# Patient Record
Sex: FEMALE | Race: BLACK OR AFRICAN AMERICAN | NOT HISPANIC OR LATINO | Employment: OTHER | RURAL
[De-identification: names, ages, dates, MRNs, and addresses within clinical notes are randomized per-mention and may not be internally consistent; named-entity substitution may affect disease eponyms.]

---

## 2022-09-30 DIAGNOSIS — M70.61 TROCHANTERIC BURSITIS OF RIGHT HIP: Primary | ICD-10-CM

## 2022-10-12 ENCOUNTER — CLINICAL SUPPORT (OUTPATIENT)
Dept: REHABILITATION | Facility: HOSPITAL | Age: 57
End: 2022-10-12
Payer: MEDICAID

## 2022-10-12 DIAGNOSIS — M25.551 BILATERAL HIP PAIN: ICD-10-CM

## 2022-10-12 DIAGNOSIS — M70.61 TROCHANTERIC BURSITIS OF RIGHT HIP: ICD-10-CM

## 2022-10-12 DIAGNOSIS — M25.552 BILATERAL HIP PAIN: ICD-10-CM

## 2022-10-12 PROCEDURE — 97161 PT EVAL LOW COMPLEX 20 MIN: CPT

## 2022-10-12 NOTE — PLAN OF CARE
"[facility]  Physical Therapy Initial Evaluation    Name: Carlita Jacobs  Clinic Number: [id number]    Therapy Diagnosis:   Encounter Diagnoses   Name Primary?    Trochanteric bursitis of right hip     Bilateral hip pain      Physician: Ben Boateng MD    Physician Orders: PT Eval and Treat   Medical Diagnosis from Referral: B trochanteric bursitis   Evaluation Date: 10/12/2022  Authorization Period Expiration: 10/12/2023  Plan of Care Expiration: 10/12/2022  Visit # / Visits authorized: 1/1 -Evaluation only     Time In: 8:53  Time Out: 9:29  Total Appointment Time (timed & untimed codes): 36 minutes    Precautions: Standard    Subjective   Date of onset: approximately 6 months ago  History of current condition - Carlita reports: insidious onset of B hip pain over th past six months that has progressively gotten worse. Patient reports that she has constant aching pain that is sometimes sharp in B hips. She reports increased pain with prolonged activity and when trying to get in and out of bed. She also states that she lies in bed with one leg hanging off of the side to get relief. Patient reports that she has had surgery in both hips to have rods placed.      Medical History:   No past medical history on file.    Surgical History:   Carlita Jacobs  has no past surgical history on file.    Medications:   Carlita currently has no medications in their medication list.    Allergies:   Review of patient's allergies indicates:  Not on File     Imaging, X-rays: revealed arthritis     Prior Therapy: None   Social History:  lives with their family  Occupation: disabled  Prior Level of Function: Modified Independent with RW   Current Level of Function: Modified Independent with RW     Pain:  Current 4/10, worst 8/10, best 4/10   Location: bilateral hips   Description: Aching and Sharp  Aggravating Factors: Getting out of bed/chair  Easing Factors: heating pad and rest    Pts goals: "to ease the pain so I can walk a " "little better"    Objective     Range of motion:  Motion Right Left    Hip flexion   Decreased due to weakness and soft tissue approximation    Decreased due to weakness and soft tissue approximation    Hip extension  Decreased due to hip flexor tightness    Decreased due to hip flexor tightness    Hip abduction  Decreased due to weakness     Decreased due to weakness    Hip adduction   Decreased due to hip ABD tightness     Decreased due to hip ABD tightness    Knee extension  WITHIN FUNCTIONAL LIMITS  WITHIN FUNCTIONAL LIMITS   Knee flexion  WITHIN FUNCTIONAL LIMITS  WITHIN FUNCTIONAL LIMITS       Manual muscle test   Muscle Right  Left    Hip flexion  MMT strength: 3+/5  MMT strength: 3+/5   Hip extension  MMT strength: 2-/5  MMT strength: 2-/5   Hip abduction  MMT strength: 3+/5  MMT strength: 3+/5   Hip adduction  MMT strength: 3+/5  MMT strength: 3+/5   Knee extension  MMT strength: 4/5  MMT strength: 4/5   Knee flexion  MMT strength: 4/5  MMT strength: 4/5   Ankle DF  MMT strength: 4/5  MMT strength: 4/5   Ankle PF  MMT strength: 4/5  MMT strength: 4/5   Ankle inversion  MMT strength: 4/5  MMT strength: 4/5   Ankle eversion  MMT strength: 4/5  MMT strength: 4/5       Gait:  Patient ambulates with increased L hip ER, increased forward trunk flexion, decreased step lengths     Clinical Special Tests:  A. Hip  1. Connor test: right Positive left Positive  3. Hip scour test: right Positive left Positive  4. Piriformis test: right Positive left Positive  5. Allie's test: right Positive left Positive      TREATMENT   No treatment provided. PT attempted to complete manual stretches and manual therapy to B hips however patient was not able to tolerate attempted treatment techniques due to increased pain.     Assessment   Carlita is a 57 y.o. female referred to outpatient Physical Therapy with a medical diagnosis of B trochanteric bursitis. Pt presents with B hip pain, muscle tightness, and muscle weakness that has " limited her overall activity tolerance and mobility.     Pt prognosis is Guarded.   Pt will benefit from skilled outpatient Physical Therapy to address the deficits stated above and in the chart below, provide pt/family education, and to maximize pt's level of independence.     Plan of care discussed with patient: Yes  Pt's spiritual, cultural and educational needs considered and patient is agreeable to the plan of care and goals as stated below:     Anticipated Barriers for therapy: chronicity of pain, abnormal movement patterns related to dwarfism, inability to tolerate stretching and manual therapy techniques    Patient is not appropriate for skilled PT services at this time due to barriers listed above.     Plan   Plan of care Certification: 10/12/2022 to 10/12/2022.-Evaluation only     Kirstie Marin, PT, DPT     Physician Signature : ____________________________________________________  Date:_______________________________________________

## 2024-11-19 ENCOUNTER — HOSPITAL ENCOUNTER (EMERGENCY)
Facility: HOSPITAL | Age: 59
Discharge: ANOTHER HOSPITAL | End: 2024-11-20
Attending: INTERNAL MEDICINE
Payer: MEDICAID

## 2024-11-19 DIAGNOSIS — E87.6 HYPOKALEMIA: ICD-10-CM

## 2024-11-19 DIAGNOSIS — A41.9 SEPSIS: ICD-10-CM

## 2024-11-19 DIAGNOSIS — N12 PYELONEPHRITIS OF RIGHT KIDNEY: ICD-10-CM

## 2024-11-19 DIAGNOSIS — I24.9 ACUTE CORONARY SYNDROME WITH HIGH TROPONIN: Primary | ICD-10-CM

## 2024-11-19 PROCEDURE — 87186 SC STD MICRODIL/AGAR DIL: CPT | Performed by: INTERNAL MEDICINE

## 2024-11-19 PROCEDURE — 87086 URINE CULTURE/COLONY COUNT: CPT | Performed by: INTERNAL MEDICINE

## 2024-11-19 PROCEDURE — 83605 ASSAY OF LACTIC ACID: CPT | Performed by: INTERNAL MEDICINE

## 2024-11-19 PROCEDURE — 36415 COLL VENOUS BLD VENIPUNCTURE: CPT | Performed by: INTERNAL MEDICINE

## 2024-11-19 PROCEDURE — 81003 URINALYSIS AUTO W/O SCOPE: CPT | Performed by: INTERNAL MEDICINE

## 2024-11-19 PROCEDURE — 83735 ASSAY OF MAGNESIUM: CPT | Performed by: INTERNAL MEDICINE

## 2024-11-19 PROCEDURE — 87149 DNA/RNA DIRECT PROBE: CPT | Performed by: INTERNAL MEDICINE

## 2024-11-19 PROCEDURE — 93005 ELECTROCARDIOGRAM TRACING: CPT

## 2024-11-19 PROCEDURE — 85025 COMPLETE CBC W/AUTO DIFF WBC: CPT | Performed by: INTERNAL MEDICINE

## 2024-11-19 PROCEDURE — 87502 INFLUENZA DNA AMP PROBE: CPT | Performed by: INTERNAL MEDICINE

## 2024-11-19 PROCEDURE — 87635 SARS-COV-2 COVID-19 AMP PRB: CPT | Performed by: INTERNAL MEDICINE

## 2024-11-19 PROCEDURE — 80053 COMPREHEN METABOLIC PANEL: CPT | Performed by: INTERNAL MEDICINE

## 2024-11-19 PROCEDURE — 84484 ASSAY OF TROPONIN QUANT: CPT | Performed by: INTERNAL MEDICINE

## 2024-11-19 PROCEDURE — 94761 N-INVAS EAR/PLS OXIMETRY MLT: CPT

## 2024-11-19 RX ORDER — OMEPRAZOLE 20 MG/1
20 CAPSULE, DELAYED RELEASE ORAL DAILY
COMMUNITY

## 2024-11-19 RX ORDER — ACETAMINOPHEN 500 MG
1000 TABLET ORAL
Status: COMPLETED | OUTPATIENT
Start: 2024-11-19 | End: 2024-11-20

## 2024-11-19 RX ORDER — ACETAMINOPHEN 325 MG/1
650 TABLET ORAL EVERY 6 HOURS PRN
COMMUNITY

## 2024-11-19 RX ORDER — CALCITRIOL 0.5 UG/1
0.5 CAPSULE ORAL DAILY
COMMUNITY

## 2024-11-19 RX ORDER — ATORVASTATIN CALCIUM 20 MG/1
20 TABLET, FILM COATED ORAL NIGHTLY
COMMUNITY
Start: 2024-11-13

## 2024-11-19 RX ORDER — GABAPENTIN 300 MG/1
300 CAPSULE ORAL 2 TIMES DAILY
COMMUNITY
Start: 2024-11-13

## 2024-11-19 RX ORDER — FERROUS SULFATE, DRIED 160(50) MG
1 TABLET, EXTENDED RELEASE ORAL 2 TIMES DAILY WITH MEALS
COMMUNITY

## 2024-11-19 RX ORDER — CEFTRIAXONE 1 G/1
1 INJECTION, POWDER, FOR SOLUTION INTRAMUSCULAR; INTRAVENOUS
Status: COMPLETED | OUTPATIENT
Start: 2024-11-20 | End: 2024-11-20

## 2024-11-20 VITALS
TEMPERATURE: 99 F | SYSTOLIC BLOOD PRESSURE: 128 MMHG | HEART RATE: 96 BPM | WEIGHT: 132 LBS | OXYGEN SATURATION: 100 % | DIASTOLIC BLOOD PRESSURE: 45 MMHG | HEIGHT: 55 IN | BODY MASS INDEX: 30.55 KG/M2 | RESPIRATION RATE: 18 BRPM

## 2024-11-20 LAB
ALBUMIN SERPL BCP-MCNC: 3 G/DL (ref 3.5–5)
ALBUMIN/GLOB SERPL: 0.7 {RATIO}
ALP SERPL-CCNC: 135 U/L (ref 40–150)
ALT SERPL W P-5'-P-CCNC: 56 U/L (ref 0–55)
ANION GAP SERPL CALCULATED.3IONS-SCNC: 22 MMOL/L (ref 7–16)
AST SERPL W P-5'-P-CCNC: 82 U/L (ref 5–34)
BACTERIA #/AREA URNS HPF: ABNORMAL /HPF
BASOPHILS # BLD AUTO: 0.02 K/UL (ref 0–0.2)
BASOPHILS NFR BLD AUTO: 0.2 % (ref 0–1)
BILIRUB SERPL-MCNC: 1.3 MG/DL
BILIRUB UR QL STRIP: ABNORMAL
BUN SERPL-MCNC: 21 MG/DL (ref 10–20)
BUN/CREAT SERPL: 18 (ref 6–20)
CALCIUM SERPL-MCNC: 9.2 MG/DL (ref 8.4–10.2)
CHLORIDE SERPL-SCNC: 104 MMOL/L (ref 98–107)
CLARITY UR: ABNORMAL
CO2 SERPL-SCNC: 15 MMOL/L (ref 22–29)
COARSE GRAN CASTS #/AREA URNS LPF: ABNORMAL /LPF
COLOR UR: ABNORMAL
CREAT SERPL-MCNC: 1.18 MG/DL (ref 0.55–1.02)
DIFFERENTIAL METHOD BLD: ABNORMAL
EGFR (NO RACE VARIABLE) (RUSH/TITUS): 54 ML/MIN/1.73M2
EOSINOPHIL # BLD AUTO: 0 K/UL (ref 0–0.5)
EOSINOPHIL NFR BLD AUTO: 0 % (ref 1–4)
ERYTHROCYTE [DISTWIDTH] IN BLOOD BY AUTOMATED COUNT: 15.5 % (ref 11.5–14.5)
FINE GRAN CASTS #/AREA URNS LPF: ABNORMAL /LPF
GLOBULIN SER-MCNC: 4.6 G/DL (ref 2–4)
GLUCOSE SERPL-MCNC: 152 MG/DL (ref 74–100)
GLUCOSE UR STRIP-MCNC: NEGATIVE MG/DL
HCT VFR BLD AUTO: 31.8 % (ref 38–47)
HGB BLD-MCNC: 10.9 G/DL (ref 12–16)
HYPOCHROMIA BLD QL SMEAR: NORMAL
IMM GRANULOCYTES # BLD AUTO: 0.08 K/UL (ref 0–0.04)
IMM GRANULOCYTES NFR BLD: 0.6 % (ref 0–0.4)
INFLUENZA A MOLECULAR (OHS): NEGATIVE
INFLUENZA B MOLECULAR (OHS): NEGATIVE
KETONES UR STRIP-SCNC: 40 MG/DL
LACTATE SERPL-SCNC: 1 MMOL/L (ref 0.5–2.2)
LACTATE SERPL-SCNC: 2.1 MMOL/L (ref 0.5–2.2)
LEUKOCYTE ESTERASE UR QL STRIP: ABNORMAL
LYMPHOCYTES # BLD AUTO: 0.47 K/UL (ref 1–4.8)
LYMPHOCYTES NFR BLD AUTO: 3.6 % (ref 27–41)
MAGNESIUM SERPL-MCNC: 1.6 MG/DL (ref 1.6–2.6)
MCH RBC QN AUTO: 25.5 PG (ref 27–31)
MCHC RBC AUTO-ENTMCNC: 34.3 G/DL (ref 32–36)
MCV RBC AUTO: 74.3 FL (ref 80–96)
MICROCYTES BLD QL SMEAR: NORMAL
MONOCYTES # BLD AUTO: 0.54 K/UL (ref 0–0.8)
MONOCYTES NFR BLD AUTO: 4.1 % (ref 2–6)
MPC BLD CALC-MCNC: 9.8 FL (ref 9.4–12.4)
MUCOUS THREADS #/AREA URNS HPF: ABNORMAL /HPF
NEUTROPHILS # BLD AUTO: 11.94 K/UL (ref 1.8–7.7)
NEUTROPHILS NFR BLD AUTO: 91.5 % (ref 53–65)
NITRITE UR QL STRIP: NEGATIVE
NRBC # BLD AUTO: 0 X10E3/UL
NRBC, AUTO (.00): 0 %
OHS QRS DURATION: 72 MS
OHS QTC CALCULATION: 567 MS
PH UR STRIP: 5.5 PH UNITS
PLATELET # BLD AUTO: 204 K/UL (ref 150–400)
PLATELET MORPHOLOGY: NORMAL
POLYCHROMASIA BLD QL SMEAR: NORMAL
POTASSIUM SERPL-SCNC: 2.8 MMOL/L (ref 3.5–5.1)
PROT SERPL-MCNC: 7.6 G/DL (ref 6.4–8.3)
PROT UR QL STRIP: >=300
RBC # BLD AUTO: 4.28 M/UL (ref 4.2–5.4)
RBC # UR STRIP: ABNORMAL /UL
RBC #/AREA URNS HPF: ABNORMAL /HPF
RENAL EPI CELLS #/AREA URNS LPF: ABNORMAL /LPF
SARS-COV-2 RDRP RESP QL NAA+PROBE: NEGATIVE
SODIUM SERPL-SCNC: 138 MMOL/L (ref 136–145)
SP GR UR STRIP: 1.02
SQUAMOUS #/AREA URNS LPF: ABNORMAL /LPF
TARGETS BLD QL SMEAR: NORMAL
TROPONIN I SERPL HS-MCNC: 42.9 NG/L
TROPONIN I SERPL HS-MCNC: 89.5 NG/L
TROPONIN I SERPL HS-MCNC: 91.2 NG/L
UROBILINOGEN UR STRIP-ACNC: 4 MG/DL
VERIGENE RESULT: ABNORMAL
WBC # BLD AUTO: 13.05 K/UL (ref 4.5–11)
WBC #/AREA URNS HPF: ABNORMAL /HPF

## 2024-11-20 PROCEDURE — 63600175 PHARM REV CODE 636 W HCPCS: Performed by: INTERNAL MEDICINE

## 2024-11-20 PROCEDURE — 83605 ASSAY OF LACTIC ACID: CPT | Performed by: INTERNAL MEDICINE

## 2024-11-20 PROCEDURE — 84484 ASSAY OF TROPONIN QUANT: CPT | Performed by: INTERNAL MEDICINE

## 2024-11-20 PROCEDURE — 25000003 PHARM REV CODE 250: Performed by: INTERNAL MEDICINE

## 2024-11-20 PROCEDURE — 36415 COLL VENOUS BLD VENIPUNCTURE: CPT | Performed by: INTERNAL MEDICINE

## 2024-11-20 PROCEDURE — 96372 THER/PROPH/DIAG INJ SC/IM: CPT | Performed by: INTERNAL MEDICINE

## 2024-11-20 RX ORDER — ENOXAPARIN SODIUM 100 MG/ML
1 INJECTION SUBCUTANEOUS
Status: COMPLETED | OUTPATIENT
Start: 2024-11-20 | End: 2024-11-20

## 2024-11-20 RX ORDER — POTASSIUM CHLORIDE 7.45 MG/ML
10 INJECTION INTRAVENOUS
Status: COMPLETED | OUTPATIENT
Start: 2024-11-20 | End: 2024-11-20

## 2024-11-20 RX ORDER — SODIUM CHLORIDE 9 MG/ML
1000 INJECTION, SOLUTION INTRAVENOUS
Status: COMPLETED | OUTPATIENT
Start: 2024-11-20 | End: 2024-11-20

## 2024-11-20 RX ADMIN — SODIUM CHLORIDE 500 ML: 9 INJECTION, SOLUTION INTRAVENOUS at 12:11

## 2024-11-20 RX ADMIN — ENOXAPARIN SODIUM 60 MG: 100 INJECTION SUBCUTANEOUS at 05:11

## 2024-11-20 RX ADMIN — ACETAMINOPHEN 1000 MG: 500 TABLET ORAL at 12:11

## 2024-11-20 RX ADMIN — CEFTRIAXONE 1 G: 1 INJECTION, POWDER, FOR SOLUTION INTRAMUSCULAR; INTRAVENOUS at 12:11

## 2024-11-20 RX ADMIN — POTASSIUM CHLORIDE 10 MEQ: 7.46 INJECTION, SOLUTION INTRAVENOUS at 12:11

## 2024-11-20 RX ADMIN — SODIUM CHLORIDE 1000 ML: 9 INJECTION, SOLUTION INTRAVENOUS at 01:11

## 2024-11-20 NOTE — ED PROVIDER NOTES
Encounter Date: 11/19/2024       History     Chief Complaint   Patient presents with    Vomiting    Altered Mental Status     Patient with nausea vomiting and right-sided abdominal pain since Sunday.  She would not know she was running a fever, there was no diarrhea GI bleed.  There was no chest pain shortness breath cough congestion sore throat or neurological complaints.  Patient was not get any better and presented here to the emergency room for workup.        Review of patient's allergies indicates:   Allergen Reactions    Hydrocodone Other (See Comments)     Patient unable to say    Nitrofurantoin macrocrystalline     Sulfamethoxazole-trimethoprim      History reviewed. No pertinent past medical history.  Past Surgical History:   Procedure Laterality Date    BACK SURGERY      CHOLECYSTECTOMY      FRACTURE SURGERY Bilateral     hips     No family history on file.  Social History     Tobacco Use    Smoking status: Never    Smokeless tobacco: Never   Substance Use Topics    Alcohol use: Never    Drug use: Never     Review of Systems   Constitutional:  Negative for fever.   HENT:  Negative for sore throat.    Respiratory:  Negative for shortness of breath.    Cardiovascular:  Negative for chest pain.   Gastrointestinal:  Negative for nausea.   Genitourinary:  Negative for dysuria.   Musculoskeletal:  Negative for back pain.   Skin:  Negative for rash.   Neurological:  Negative for weakness.   Hematological:  Does not bruise/bleed easily.       Physical Exam     Initial Vitals [11/19/24 2329]   BP Pulse Resp Temp SpO2   100/71 (!) 130 (!) 48 (!) 102.2 °F (39 °C) (!) 93 %      MAP       --         Physical Exam    Vitals reviewed.  Constitutional: She appears well-developed.   Eyes: Pupils are equal, round, and reactive to light.   Neck: Trachea normal. Neck supple.   Normal range of motion.   Full passive range of motion without pain.     Cardiovascular:  Regular rhythm, normal heart sounds and normal pulses.    Tachycardia present.         Pulmonary/Chest: Effort normal and breath sounds normal.   Abdominal: Abdomen is soft and protuberant. Bowel sounds are decreased. There is abdominal tenderness in the right upper quadrant and right lower quadrant.     There is no rebound and no guarding.   Musculoskeletal:         General: Normal range of motion.      Cervical back: Full passive range of motion without pain, normal range of motion and neck supple.     Neurological: She is alert. She has normal strength. No cranial nerve deficit. GCS eye subscore is 4. GCS verbal subscore is 5. GCS motor subscore is 6.   Skin: Skin is warm.   Psychiatric: She has a normal mood and affect.         Medical Screening Exam   See Full Note    ED Course   Procedures  Labs Reviewed   CULTURE, BLOOD - Abnormal       Result Value    Gram Stain Result Gram negative bacilli (*)     Gram Stain Result Gram negative bacilli (*)    GRAM-NEGATIVE BLOOD CULTURE TEST - Abnormal    Verigene Result Klebsiella pneumoniae (*)    COMPREHENSIVE METABOLIC PANEL - Abnormal    Sodium 138      Potassium 2.8 (*)     Chloride 104      CO2 15 (*)     Anion Gap 22 (*)     Glucose 152 (*)     BUN 21 (*)     Creatinine 1.18 (*)     BUN/Creatinine Ratio 18      Calcium 9.2      Total Protein 7.6      Albumin 3.0 (*)     Globulin 4.6 (*)     A/G Ratio 0.7      Bilirubin, Total 1.3      Alk Phos 135      ALT 56 (*)     AST 82 (*)     eGFR 54 (*)    URINALYSIS, REFLEX TO URINE CULTURE - Abnormal    Color, UA Light Orange (*)     Clarity, UA Cloudy      pH, UA 5.5      Leukocytes, UA Small (*)     Nitrites, UA Negative      Protein, UA >=300 (*)     Glucose, UA Negative      Ketones, UA 40 (*)     Urobilinogen, UA 4.0 (*)     Bilirubin, UA Moderate (*)     Blood, UA Large (*)     Specific Gravity, UA 1.025     TROPONIN I - Abnormal    Troponin I High Sensitivity 42.9 (*)    CBC WITH DIFFERENTIAL - Abnormal    WBC 13.05 (*)     RBC 4.28      Hemoglobin 10.9 (*)     Hematocrit  31.8 (*)     MCV 74.3 (*)     MCH 25.5 (*)     MCHC 34.3      RDW 15.5 (*)     Platelet Count 204      MPV 9.8      Neutrophils % 91.5 (*)     Lymphocytes % 3.6 (*)     Monocytes % 4.1      Eosinophils % 0.0 (*)     Basophils % 0.2      Immature Granulocytes % 0.6 (*)     nRBC, Auto 0.0      Neutrophils, Abs 11.94 (*)     Lymphocytes, Absolute 0.47 (*)     Monocytes, Absolute 0.54      Eosinophils, Absolute 0.00      Basophils, Absolute 0.02      Immature Granulocytes, Absolute 0.08 (*)     nRBC, Absolute 0.00      Diff Type Scan Smear     URINALYSIS, MICROSCOPIC - Abnormal    WBC, UA 11-15 (*)     RBC, UA 0-3      Bacteria, UA Loaded (*)     Squamous Epithelial Cells, UA Few (*)     Renal Epithelial Cells, UA Occasional (*)     Mucus, UA Few (*)     Fine Granular Casts, UA 0-2 (*)     Coarse Granular Casts, UA 0-2 (*)    TROPONIN I - Abnormal    Troponin I High Sensitivity 91.2 (*)    TROPONIN I - Abnormal    Troponin I High Sensitivity 89.5 (*)    INFLUENZA A & B BY MOLECULAR - Normal    INFLUENZA A MOLECULAR Negative      INFLUENZA B MOLECULAR  Negative     LACTIC ACID, PLASMA - Normal    Lactic Acid 2.1     MAGNESIUM - Normal    Magnesium 1.6     SARS-COV-2 RNA AMPLIFICATION, QUAL - Normal    SARS COV-2 Molecular Negative      Narrative:     Negative SARS-CoV results should not be used as the sole basis for treatment or patient management decisions; negative results should be considered in the context of a patient's recent exposures, history and the presene of clinical signs and symptoms consistent with COVID-19.  Negative results should be treated as presumptive and confirmed by molecular assay, if necessary for patient management.   LACTIC ACID, PLASMA - Normal    Lactic Acid 1.0     CULTURE, BLOOD   CULTURE, URINE   CBC W/ AUTO DIFFERENTIAL    Narrative:     The following orders were created for panel order CBC auto differential.  Procedure                               Abnormality         Status                      ---------                               -----------         ------                     CBC with Differential[[id number]]       Abnormal            Final result                 Please view results for these tests on the individual orders.   CBC MORPHOLOGY    Platelet Morphology Normal      Microcytosis Few      Target Cells Few      Polychromasia Few      Hypochromic 1+       EKG Readings: (Independently Interpreted)   Initial Reading: No STEMI. Rhythm: Sinus Tachycardia. Heart Rate: 126. Ectopy: No Ectopy. Conduction: Normal. ST Segments: Normal ST Segments. T Waves: Normal. Axis: Normal. Clinical Impression: Normal Sinus Rhythm   Sinus tachycardia with a heart rate of 126 and no acute ischemic changes   Other EKG Interpretations: Normal sinus rhythm with a heart rate of 57 and no acute ischemic changes right bundle-branch block pattern     ECG Results              EKG 12-lead (Final result)        Collection Time Result Time QRS Duration OHS QTC Calculation    11/19/24 23:45:01 11/20/24 09:28:51 72 567                     Final result by Interface, Lab [facility] (11/20/24 09:28:56)                   Narrative:    Test Reason : A41.9,    Vent. Rate : 126 BPM     Atrial Rate : 126 BPM     P-R Int : 136 ms          QRS Dur :  72 ms      QT Int : 392 ms       P-R-T Axes :  47  31  52 degrees    QTcB Int : 567 ms    Sinus tachycardia  Cannot rule out Inferior infarct ,age undetermined  Abnormal ECG  No previous ECGs available  Confirmed by Isaac Guerrero ([id number]) on 11/20/2024 9:28:50 AM    Referred By:            Confirmed By: Isaac Guerrero                                  Imaging Results              CT Abdomen Pelvis  Without Contrast (Final result)  Result time 11/20/24 01:33:36      Final result by Indy Bernal MD (11/20/24 01:33:36)                   Impression:      No convincing acute abnormality is appreciated in the abdomen or pelvis as imaged noting small portion of the descending colon is  incompletely imaged.  There is no evidence of bowel obstruction or mesenteric inflammatory changes.    Diverticulosis without diverticulitis.    Right nephrolithiasis without evidence of obstruction.  Mild right perinephric strandy changes in the fat are nonspecific and may be related to chronic changes or edematous or inflammatory changes.  Correlate for the possibility of infectious etiology.    Uterine myomas.    Incidental nonacute additional findings as above.      Electronically signed by: Indy Bernal  Date:    11/20/2024  Time:    01:33               Narrative:    EXAMINATION:  CT ABDOMEN PELVIS WITHOUT CONTRAST    CLINICAL HISTORY:  Sepsis;    TECHNIQUE:  Low dose axial images, sagittal and coronal reformations were obtained from the lung bases to the pubic symphysis.    COMPARISON:  None    FINDINGS:  Motion artifact is present limiting fine detail.  Lack of IV contrast also limits evaluation.  Portions of the left lateral abdominal wall including portions of the descending colon are truncated from view on the images.    Abdomen:    - Liver: Liver appears homogeneous and is not significantly enlarged.    - Gallbladder: Cholecystectomy.    - Bile Ducts: No evidence of intra or extra hepatic biliary ductal dilation.    - Spleen: Negative.    - Kidneys: There is a nonobstructing calculus in the right renal pelvis measuring 7.2 mm.  There is no ureterectasis or cortical lesion.  Punctate calculi also noted in the lower pole the right kidney.  There are mild strandy changes in the perinephric fat on the right which are commonly noted and are nonspecific.  Infectious/inflammatory or edematous changes if this patient has had prior obstruction are considerations as well as chronic changes.    Left kidney cortex appears homogeneous as imaged.  There is no nephrolithiasis or obstruction on the left.    - Adrenals: Unremarkable.    - Pancreas: No mass or peripancreatic fat stranding.    - Retroperitoneum:  No  significant adenopathy.    - Vascular: No abdominal aortic aneurysm.    - Abdominal wall:  Unremarkable.    Pelvis:    Bladder is unremarkable as imaged.  The uterus is low lobular in contour and contains several coarse calcifications compatible with myomas.  There is no adnexal mass, adenopathy, or free fluid.    Bowel/Mesentery:    Stomach and small bowel are unremarkable.  There is diverticulosis of the distal descending and sigmoid colon.  There is no evidence of diverticulitis or bowel obstruction noting a small portion of the descending colon is not included.  No mesenteric edematous changes or adenopathy seen.  There is no free fluid or extraluminal free air in the imaged abdomen or pelvis.  Appendix is normal.    Bones:  No acute osseous abnormality and no suspicious lytic or blastic lesion. Degenerative changes of the hips and bilateral inter medullary laura in the imaged femurs as well as degenerative changes the SI joints and lumbar spine.                                       CT Chest Without Contrast (Final result)  Result time 11/20/24 01:18:24      Final result by Indy Brenal MD (11/20/24 01:18:24)                   Impression:      No appreciable acute or significant focal abnormality involving the thorax as imaged.  Mild dependent atelectasis otherwise lungs appear clear.      Electronically signed by: Indy Bernal  Date:    11/20/2024  Time:    01:18               Narrative:    EXAMINATION:  CT CHEST WITHOUT CONTRAST    CLINICAL HISTORY:  Sepsis;    TECHNIQUE:  Low dose axial images, sagittal and coronal reformations were obtained from the thoracic inlet to the lung bases without contrast.    COMPARISON:  Chest x-ray 11/20/2024    FINDINGS:  Base of Neck: No significant abnormality.    Thoracic soft tissues: Unremarkable.    Aorta: Left-sided aortic arch.  No aneurysm and no significant atherosclerosis    Heart: Normal in size. No pericardial effusion.    Pulmonary vasculature: Pulmonary  arteries distribute normally and appear normal in caliber.    Alysha/Mediastinum: No pathologic sarita enlargement.    Airways: Patent.    Lungs/Pleura: There is mild dependent atelectasis bilaterally.  No consolidation, suspicious nodule or pleural effusion or thickening is seen.    Esophagus: Unremarkable.    Upper Abdomen: No abnormality of the partially imaged upper abdomen.    Bones: No acute fracture. No suspicious lytic or sclerotic lesions.  Spine appears aligned.                                       X-Ray Chest AP Portable (Final result)  Result time 11/20/24 00:59:20      Final result by Indy Bernal MD (11/20/24 00:59:20)                   Impression:      No acute abnormality.      Electronically signed by: Indy Bernal  Date:    11/20/2024  Time:    00:59               Narrative:    EXAMINATION:  XR CHEST AP PORTABLE    CLINICAL HISTORY:  Sepsis;    TECHNIQUE:  Single frontal view of the chest was performed.    COMPARISON:  None    FINDINGS:  Patient is slightly rotated to the left.  The cardiac silhouette is not enlarged.  There is no significant focal lung parenchymal or pleural abnormality.  There are degenerative changes of the right shoulder.  Osseous structures otherwise grossly appear intact.                                       Medications   acetaminophen tablet 1,000 mg (1,000 mg Oral Given 11/20/24 0003)   sodium chloride 0.9% bolus 500 mL 500 mL ( Intravenous Stopped 11/20/24 0116)   cefTRIAXone injection 1 g (1 g Intravenous Given 11/20/24 0003)   sodium chloride 0.9% bolus 500 mL 500 mL ( Intravenous Stopped 11/20/24 0149)   potassium chloride 10 mEq in 100 mL IVPB (0 mEq Intravenous Stopped 11/20/24 0144)   0.9%  NaCl infusion ( Intravenous Verify Only 11/20/24 0203)   enoxaparin injection 60 mg (60 mg Subcutaneous Given 11/20/24 4289)     Medical Decision Making  Patient was right side of the abdominal pain with nausea vomiting since Sunday rule out gastroenteritis, ileus, bowel  obstruction, gallbladder disease, appendicitis, pyelonephritis, urinary tract infection, pneumonia, cardiac ischemia, COVID or influenza bacterial infection.    Amount and/or Complexity of Data Reviewed  Labs: ordered. Decision-making details documented in ED Course.  Radiology: ordered. Decision-making details documented in ED Course.  Discussion of management or test interpretation with external provider(s): PATIENT WITH TROPONIN ELEVATED 42, urine shows white blood cells leukocytes consistent with pyelonephritis or UTI, blood pressures dropped down to 90 systolic consistent with sepsis, will give fluids Crook catheter.  Patient was compound possible non STEMI versus hypotension versus sepsis, will need high level care.  Discussed with the patient wants to be transferred to mobile for further care.    Risk  OTC drugs.  Prescription drug management.      Additional MDM:   Sepsis:   This patient does have evidence of infective focus  My overall impression is sepsis.  Source: Abdominal  Antibiotics given- Antibiotics     Patient Encounter Information Not Found      Latest lactate reviewed- 2.1  Organ dysfunction indicated by ELEVATED TROPONIN    Fluid challenge Not needed - patient is not hypotensive      Post- resuscitation assessment No - Post resuscitation assessment not needed       Will Not start Pressors- Levophed for MAP of 65  Source control achieved by:  FLUIDS        RE-EVALUATE PULSES IN ALL AREA, RE-EVALUATION.              ED Course as of 11/21/24 0624   Wed Nov 20, 2024   0035 Magnesium : 1.6 [PW]   0036 Comprehensive metabolic panel(!) [PW]   0036 CBC auto differential(!) [PW]   0036 Influenza A, Molecular: Negative [PW]   0036 Influenza B, Molecular: Negative [PW]   0036 SARS COV-2 MOLECULAR: Negative [PW]   0052 Troponin I(!!) [PW]   0052 Troponin I High Sensitivity(!!): 42.9 [PW]   0052 Magnesium : 1.6 [PW]   0052 Urinalysis, Microscopic(!) [PW]   0053 Urinalysis, Reflex to Urine Culture(!) [PW]    0201 CT Chest Without Contrast [PW]   0201 X-Ray Chest AP Portable [PW]   0602 2nd troponin elevated, patient has no chest pain.  Blood pressure 104 systolic, repeat troponin. [PW]      ED Course User Index  [PW] Vaughn Rooney MD                           Clinical Impression:   Final diagnoses:  [A41.9] Sepsis  [I24.9] Acute coronary syndrome with high troponin (Primary)  [N12] Pyelonephritis of right kidney  [E87.6] Hypokalemia        ED Disposition Condition    Transfer to Another Facility Serious                Vaughn Rooney MD  11/21/24 1576

## 2024-11-20 NOTE — ED NOTES
EMS ARRIVED REPORT TO EMANUEL - PT ASSISTED UP TO [facility] THEN TO [facility] PREPARED FOR TRANSFER

## 2024-11-20 NOTE — ED NOTES
[facility] CALLED FOR TRANSPORT TO [facility] ED. [facility] WILL BE RESPONDING.

## 2024-11-20 NOTE — ED NOTES
Rec'd call from Cristian with [facility]. Patient accepted to [facility] ED by Dr. Bennie Ramirez. Report to be called to [phone].

## 2024-11-20 NOTE — ED TRIAGE NOTES
Patient brought in by sister and family with c/o AMS. Sister states patient had been vomiting since  and not feeling well, that tonight pt was sitting there staring and would not focus on her when being spoken to. States that gradually patient did start to talk but did not recognize sister. Pt to ER 3 via wc. Pt able to transfer from bed to chair with assistance. Pt asked where she was and how she got there. Patient able to recite , recognizes sister, and able to answer many medical history questions. Disoriented to time and place. Changed out of panties and pants due to urinating.

## 2024-11-22 LAB
BACTERIA BLD CULT: ABNORMAL
GRAM STN SPEC: ABNORMAL
GRAM STN SPEC: ABNORMAL
UA COMPLETE W REFLEX CULTURE PNL UR: ABNORMAL

## 2024-11-22 RX ORDER — CEFDINIR 300 MG/1
300 CAPSULE ORAL 2 TIMES DAILY
Qty: 20 CAPSULE | Refills: 0 | Status: SHIPPED | OUTPATIENT
Start: 2024-11-22 | End: 2024-12-02

## 2024-11-23 LAB — BACTERIA BLD CULT: NORMAL
